# Patient Record
Sex: FEMALE | Race: WHITE | NOT HISPANIC OR LATINO | Employment: FULL TIME | URBAN - METROPOLITAN AREA
[De-identification: names, ages, dates, MRNs, and addresses within clinical notes are randomized per-mention and may not be internally consistent; named-entity substitution may affect disease eponyms.]

---

## 2018-07-11 ENCOUNTER — HOSPITAL ENCOUNTER (OUTPATIENT)
Dept: MEDSURG UNIT | Facility: HOSPITAL | Age: 23
End: 2018-07-13
Attending: COLON & RECTAL SURGERY | Admitting: COLON & RECTAL SURGERY

## 2018-07-11 LAB
ABS NEUT (OLG): 16.33 X10(3)/MCL (ref 2.1–9.2)
ALBUMIN SERPL-MCNC: 4.4 GM/DL (ref 3.4–5)
ALBUMIN/GLOB SERPL: 1 {RATIO}
ALP SERPL-CCNC: 55 UNIT/L (ref 45–117)
ALT SERPL-CCNC: 20 UNIT/L (ref 13–56)
AMYLASE SERPL-CCNC: 86 UNIT/L (ref 25–115)
APPEARANCE, UA: ABNORMAL
AST SERPL-CCNC: 13 UNIT/L (ref 15–37)
B-HCG SERPL QL: NEGATIVE
BACTERIA SPEC CULT: NORMAL
BASOPHILS # BLD AUTO: 0.02 X10(3)/MCL (ref 0–0.2)
BASOPHILS NFR BLD AUTO: 0.1 % (ref 0–1)
BILIRUB SERPL-MCNC: 1.1 MG/DL (ref 0.2–1)
BILIRUB UR QL STRIP: NEGATIVE
BILIRUBIN DIRECT+TOT PNL SERPL-MCNC: 0.3 MG/DL (ref 0–0.2)
BILIRUBIN DIRECT+TOT PNL SERPL-MCNC: 0.8 MG/DL (ref 0–1)
BUN SERPL-MCNC: 12 MG/DL (ref 7–18)
CALCIUM SERPL-MCNC: 8.9 MG/DL (ref 8.5–10.1)
CHLORIDE SERPL-SCNC: 105 MMOL/L (ref 98–107)
CO2 SERPL-SCNC: 24 MMOL/L (ref 21–32)
COLOR UR: YELLOW
CREAT SERPL-MCNC: 0.64 MG/DL (ref 0.55–1.02)
EOSINOPHIL # BLD AUTO: 0.04 X10(3)/MCL (ref 0–0.9)
EOSINOPHIL NFR BLD AUTO: 0.2 % (ref 0–6.4)
ERYTHROCYTE [DISTWIDTH] IN BLOOD BY AUTOMATED COUNT: 12.6 % (ref 11.5–17)
GLOBULIN SER-MCNC: 4 GM/DL (ref 2–4)
GLUCOSE (UA): NEGATIVE
GLUCOSE SERPL-MCNC: 116 MG/DL (ref 74–106)
HCT VFR BLD AUTO: 43.5 % (ref 37–47)
HGB BLD-MCNC: 15 GM/DL (ref 12–16)
HGB UR QL STRIP: NEGATIVE
IMM GRANULOCYTES # BLD AUTO: 0.06 10*3/UL (ref 0–0.02)
IMM GRANULOCYTES NFR BLD AUTO: 0.3 % (ref 0–0.43)
KETONES UR QL STRIP: ABNORMAL
LEUKOCYTE ESTERASE UR QL STRIP: ABNORMAL
LIPASE SERPL-CCNC: 139 UNIT/L (ref 73–393)
LYMPHOCYTES # BLD AUTO: 1.6 X10(3)/MCL (ref 0.6–4.6)
LYMPHOCYTES NFR BLD AUTO: 8.4 % (ref 16–44)
MCH RBC QN AUTO: 30.7 PG (ref 27–31)
MCHC RBC AUTO-ENTMCNC: 34.5 GM/DL (ref 33–36)
MCV RBC AUTO: 89.1 FL (ref 80–94)
MONOCYTES # BLD AUTO: 0.95 X10(3)/MCL (ref 0.1–1.3)
MONOCYTES NFR BLD AUTO: 5 % (ref 4–12.1)
NEUTROPHILS # BLD AUTO: 16.33 X10(3)/MCL (ref 2.1–9.2)
NEUTROPHILS NFR BLD AUTO: 86 % (ref 43–73)
NITRITE UR QL STRIP: NEGATIVE
NRBC BLD AUTO-RTO: 0 % (ref 0–0.2)
PH UR STRIP: 7.5 [PH] (ref 5–7)
PLATELET # BLD AUTO: 242 X10(3)/MCL (ref 130–400)
PMV BLD AUTO: 10.5 FL (ref 7.4–10.4)
POTASSIUM SERPL-SCNC: 3.6 MMOL/L (ref 3.5–5.1)
PROT SERPL-MCNC: 8.4 GM/DL (ref 6.4–8.2)
PROT UR QL STRIP: NEGATIVE
RBC # BLD AUTO: 4.88 X10(6)/MCL (ref 4.2–5.4)
RBC #/AREA URNS HPF: 0 /[HPF]
SODIUM SERPL-SCNC: 138 MMOL/L (ref 136–145)
SP GR UR STRIP: 1.02 (ref 1–1.03)
SQUAMOUS EPITHELIAL, UA: NORMAL /LPF
UROBILINOGEN UR STRIP-ACNC: NEGATIVE
WBC # SPEC AUTO: 19 X10(3)/MCL (ref 4.5–11.5)
WBC #/AREA URNS HPF: NORMAL /HPF

## 2018-07-13 LAB
ABS NEUT (OLG): 6.59 X10(3)/MCL (ref 2.1–9.2)
ALBUMIN SERPL-MCNC: 2.9 GM/DL (ref 3.4–5)
ALBUMIN/GLOB SERPL: 0.9 {RATIO}
ALP SERPL-CCNC: 43 UNIT/L (ref 38–126)
ALT SERPL-CCNC: 14 UNIT/L (ref 12–78)
AST SERPL-CCNC: 7 UNIT/L (ref 15–37)
BASOPHILS # BLD AUTO: 0 X10(3)/MCL (ref 0–0.2)
BASOPHILS NFR BLD AUTO: 0 %
BILIRUB SERPL-MCNC: 0.5 MG/DL (ref 0.2–1)
BILIRUBIN DIRECT+TOT PNL SERPL-MCNC: 0.2 MG/DL (ref 0–0.2)
BILIRUBIN DIRECT+TOT PNL SERPL-MCNC: 0.3 MG/DL (ref 0–0.8)
BUN SERPL-MCNC: 4 MG/DL (ref 7–18)
CALCIUM SERPL-MCNC: 8.6 MG/DL (ref 8.5–10.1)
CHLORIDE SERPL-SCNC: 107 MMOL/L (ref 98–107)
CO2 SERPL-SCNC: 26 MMOL/L (ref 21–32)
CREAT SERPL-MCNC: 0.5 MG/DL (ref 0.55–1.02)
EOSINOPHIL # BLD AUTO: 0 X10(3)/MCL (ref 0–0.9)
EOSINOPHIL NFR BLD AUTO: 0 %
ERYTHROCYTE [DISTWIDTH] IN BLOOD BY AUTOMATED COUNT: 12.7 % (ref 11.5–17)
GLOBULIN SER-MCNC: 3.1 GM/DL (ref 2.4–3.5)
GLUCOSE SERPL-MCNC: 100 MG/DL (ref 74–106)
HCT VFR BLD AUTO: 34.7 % (ref 37–47)
HGB BLD-MCNC: 11.5 GM/DL (ref 12–16)
LYMPHOCYTES # BLD AUTO: 2.5 X10(3)/MCL (ref 0.6–4.6)
LYMPHOCYTES NFR BLD AUTO: 25 %
MCH RBC QN AUTO: 30.7 PG (ref 27–31)
MCHC RBC AUTO-ENTMCNC: 33.1 GM/DL (ref 33–36)
MCV RBC AUTO: 92.5 FL (ref 80–94)
MONOCYTES # BLD AUTO: 0.7 X10(3)/MCL (ref 0.1–1.3)
MONOCYTES NFR BLD AUTO: 7 %
NEUTROPHILS # BLD AUTO: 6.59 X10(3)/MCL (ref 1.4–7.9)
NEUTROPHILS NFR BLD AUTO: 66 %
PLATELET # BLD AUTO: 206 X10(3)/MCL (ref 130–400)
PMV BLD AUTO: 11 FL (ref 9.4–12.4)
POTASSIUM SERPL-SCNC: 3.8 MMOL/L (ref 3.5–5.1)
PROT SERPL-MCNC: 6 GM/DL (ref 6.4–8.2)
RBC # BLD AUTO: 3.75 X10(6)/MCL (ref 4.2–5.4)
SODIUM SERPL-SCNC: 142 MMOL/L (ref 136–145)
WBC # SPEC AUTO: 9.9 X10(3)/MCL (ref 4.5–11.5)

## 2018-07-14 LAB — FINAL CULTURE: NORMAL

## 2022-04-30 NOTE — DISCHARGE SUMMARY
Patient:   Rachel Pool            MRN: [id number]            FIN: [id number]               Age:   22 years     Sex:  Female     :  1995   Associated Diagnoses:   Abdominal pain; Appendicitis   Author:   Felipe Laird      Results Review   General results   New results : ResultsNew (never reviewed)   2018 4:20 CDT       Sodium Lvl                142 mmol/L                             Potassium Lvl             3.8 mmol/L                             Chloride                  107 mmol/L                             CO2                       26.0 mmol/L                             Calcium Lvl               8.6 mg/dL                             Glucose Lvl               100 mg/dL                             BUN                       4.0 mg/dL  LOW                             Creatinine                0.50 mg/dL  LOW                             eGFR-AA                   >60 mL/min/1.73 m2  NA                             eGFR-LEYLA                  >60 mL/min/1.73 m2  NA                             Bili Total                0.5 mg/dL                             Bili Direct               0.20 mg/dL                             Bili Indirect             0.30 mg/dL                             AST                       7 unit/L  LOW                             ALT                       14 unit/L                             Alk Phos                  43 unit/L                             Total Protein             6.0 gm/dL  LOW                             Albumin Lvl               2.90 gm/dL  LOW                             Globulin                  3.10 gm/dL                             A/G Ratio                 0.9  NA                             WBC                       9.9 x10(3)/mcL                             RBC                       3.75 x10(6)/mcL  LOW                             Hgb                       11.5 gm/dL  LOW                             Hct                       34.7 %  LOW                              Platelet                  206 x10(3)/mcL                             MCV                       92.5 fL                             MCH                       30.7 pg                             MCHC                      33.1 gm/dL                             RDW                       12.7 %                             MPV                       11.0 fL                             Abs Neut                  6.59 x10(3)/mcL                             Neutro Auto               66 %  NA                             Lymph Auto                25 %  NA                             Mono Auto                 7 %  NA                             Eos Auto                  0 %  NA                             Abs Eos                   0.0 x10(3)/mcL                             Basophil Auto             0 %  NA                             Abs Neutro                6.59 x10(3)/mcL                             Abs Lymph                 2.5 x10(3)/mcL                             Abs Mono                  0.7 x10(3)/mcL                             Abs Baso                  0.0 x10(3)/mcL        Discharge Information   Discharge Summary Information     Admitted  7/11/2018.     Discharged  7/13/2018.     Abdominal pain (PNED [id number]).     Appendicitis ([id number] K37).        Physical Examination   Vital signs   Within normal limits.     General   Within normal limits.     Alert and oriented X3.     No acute distress.     Cardiovascular   Within normal limits.     Respiratory   Within normal limits.     Gastrointestinal   Soft and nontender.  Laparoscopic sites clean dry and intact.  Mild soreness around lap sites.  Complaining of of some nausea..     Upper extremity musculoskeletal   Within normal limits.     Back/ Torso musculoskeletal   Within normal limits.     Lower extremity musculoskeletal   Within normal limits.     Integumentary   Within normal limits.     Neurologic   Within  normal limits.     Alert and oriented.        Hospital Course   22-year-old  female presented to the emergency department with acute onset of right lower quadrant abdominal pain.  She had a workup that was equivocal for acute appendicitis.  She did not have any vaginal discharge and was in a monogamous relationship but used protection.  She was taken on hospital day 1 for laparoscopic appendectomy that was uneventful.  She did have some expected postoperative nausea as well as tenderness around the laparoscopic sites.  We will advance her diet this morning and continue pain control and nausea control.  If she was able to tolerate a diet this morning to be discharged home.  She should not lift anything more than 10 pounds for 4 weeks.  She will follow-up in our clinic as scheduled.  In the unlikely event that her symptoms persist she should see her primary care doctor for a GYN workup.  Although this is unlikely given her presentation and history.      Discharge Plan   As above.

## 2022-04-30 NOTE — ED PROVIDER NOTES
Patient:   Rachel Pool            MRN: [id number]            FIN: [id number]               Age:   22 years     Sex:  Female     :  1995   Associated Diagnoses:   Appendicitis   Author:   Speedy Calix MD      Basic Information   Time seen: Immediately upon arrival.   Arrival mode: Private vehicle.   History limitation: None.   Additional information: Chief Complaint from Nursing Triage Note : Chief Complaint   2018 22:16 CDT      Chief Complaint           started today as epigastric pain and nausea; radiating now to lower abd-  .      History of Present Illness   The patient presents with abdominal pain.  The onset was 7  hours ago.  The course/duration of symptoms is constant.  The character of symptoms is crampy.  The Location of pain at onset was upper and abdominal.  The degree at present is moderate.  The Location of pain at present is lower and abdominal.  Radiating pain: none. The exacerbating factor is none.  The relieving factor is none.  Risk factors consist of none.  Associated symptoms: nausea, denies vomiting, denies diarrhea and denies fever.  Additional history: sexual history: denies unprotected intercourse and denies multiple partners.  Denies vaginal bleeding vaginal discharge..        Review of Systems   Constitutional symptoms:  Negative except as documented in HPI.   Skin symptoms:  Negative except as documented in HPI.   Eye symptoms:  Negative except as documented in HPI.   ENMT symptoms:  Negative except as documented in HPI.   Respiratory symptoms:  Negative except as documented in HPI.   Cardiovascular symptoms:  Negative except as documented in HPI.   Gastrointestinal symptoms:  Abdominal pain, nausea.    Genitourinary symptoms:  Negative except as documented in HPI.   Musculoskeletal symptoms:  Negative except as documented in HPI.   Neurologic symptoms:  Negative except as documented in HPI.   Psychiatric symptoms:  Negative except as documented in HPI.    Endocrine symptoms:  Negative except as documented in HPI.   Hematologic/Lymphatic symptoms:  Negative except as documented in HPI.   Allergy/immunologic symptoms:  Negative except as documented in HPI.             Additional review of systems information: All other systems reviewed and otherwise negative.      Health Status   Allergies:    Allergic Reactions (Selected)  Severity Not Documented  Augmentin- No reactions were documented.  Ciprofloxacin- No reactions were documented.  Cyproheptadine Hydrochloride- No reactions were documented.  Latex- No reactions were documented.  Penicillins- No reactions were documented.,    Allergies (5) Active Reaction  Augmentin None Documented  ciprofloxacin None Documented  Cyproheptadine Hydrochloride None Documented  Latex None Documented  penicillins None Documented  .   Medications:  (Selected)   Inpatient Medications  Ordered  IVF Normal Saline NS Bolus 1000ml 1,000 mL: 1,000 mL, 1,000 mL, IV, 999 mL/hr, start date 07/11/18 22:29:00 CDT  Documented Medications  Documented  Lo Loestrin Fe 1 mg-10 mcg (24)/10 mcg (2) tablet: 1 tab(s), Oral, Daily.   Menstrual history: Last menstrual period: 3 week(s) ago.      Past Medical/ Family/ Social History   Medical history:    No active or resolved past medical history items have been selected or recorded..   Surgical history:    [place] tooth ([id number]).  Tonsils and adenoids ([id number])..   Family history:    No family history items have been selected or recorded..   Social history:    Social & Psychosocial Habits    Alcohol  12/20/2015 Risk Assessment: Denies Alcohol Use    07/11/2018  Use: Current    Type: Wine    Frequency: 1-2 times per week    Substance Abuse  12/20/2015 Risk Assessment: Denies Substance Abuse    Tobacco  12/20/2015 Risk Assessment: Denies Tobacco Use    07/11/2018  Use: Never smoker  , Alcohol use: Denies, Tobacco use: Denies, Drug use: Denies.   Problem list:    No qualifying data available  .      Physical  Examination               Vital Signs   Vital Signs   7/11/2018 22:16 CDT      Temperature Oral          37.0 DegC                             Temperature Oral (calculated)             98.60 DegF                             Peripheral Pulse Rate     106 bpm  HI                             Respiratory Rate          16 br/min                             SpO2                      99 %                             Systolic Blood Pressure   138 mmHg                             Diastolic Blood Pressure  92 mmHg  HI  .      Vital Signs (last 24 hrs)_____  Last Charted___________  Temp Oral     37.0 DegC  (JUL 11 22:16)  Heart Rate Peripheral   H 106bpm  (JUL 11 22:16)  Resp Rate         16 br/min  (JUL 11 22:16)  SBP      138 mmHg  (JUL 11 22:16)  DBP      H 92mmHg  (JUL 11 22:16)  SpO2      99 %  (JUL 11 22:16)  .   General:  Alert, no acute distress.    Skin:  Warm, dry, intact, no rash.    Head:  Atraumatic.   Neck:  Supple.   Eye:  Normal conjunctiva.   Ears, nose, mouth and throat:  Oral mucosa moist.   Cardiovascular:  Regular rate and rhythm, No murmur.    Respiratory:  Lungs are clear to auscultation, respirations are non-labored.    Gastrointestinal:  Soft, Non distended, Normal bowel sounds, Tenderness: Moderate, suprapubic, Guarding: Negative, Rebound: Negative.    Musculoskeletal:  Normal ROM, normal strength.    Neurological:  Alert and oriented to person, place, time, and situation, No focal neurological deficit observed.       Medical Decision Making   Documents reviewed:  Emergency department nurses' notes.   Orders  Launch Orders   Pharmacy:  Bentyl 10 mg/mL injectable solution (Order): 20 mg, form: Injection, IM, Now, first dose 7/11/2018 22:48 CDT, stop date 7/11/2018 22:48 CDT, STAT, Launch Orders   Pharmacy:  morphine 2 mg/mL injectable solution (Order): 6 mg, IV, Once, first dose 7/12/2018 1:15 CDT, stop date 7/12/2018 1:15 CDT, STAT.    Pregnancy test:  UCG-negative.   Results review:  Lab results :  Lab View   7/11/2018 22:36 CDT      WBC                       19.0 x10(3)/mcL  HI  ,    No qualifying data available.    Radiology results:  Computed tomography, reviewed radiologist's report.       Reexamination/ Reevaluation   Time: 7/12/2018 00:30:00 .   Notes: pt's tenderness is more RLQ/suprapubic than LLQ.      Impression and Plan   Diagnosis   Appendicitis ([id number] K37)      Calls-Consults   -  Michelle FNP-C, [facility].    -  7/12/2018 01:13:00 , Rashel EVANS, oFrtino HADLEY.    Plan   Condition: Stable.    Disposition: Medically cleared, Admit time  7/12/2018 01:13:00.

## 2022-04-30 NOTE — H&P
Patient:   Rachel Pool            MRN: [id number]            FIN: [id number]               Age:   22 years     Sex:  Female     :  1995   Associated Diagnoses:   None   Author:   Felipe Laird      Basic Information   Admit information:  Appendicitis .    Source of history:  Self, Family member, Medical record.    Present at bedside:  Family member, Medical personnel.    Referral source:  Emergency department.    History limitation:  None.       Chief Complaint   2018 22:16 CDT      started today as epigastric pain and nausea; radiating now to lower abd-        History of Present Illness   Very pleasant 22-year-old female who presents with a relatively sudden onset of right lower quadrant abdominal pain and nausea started approximately 3 PM yesterday while at work.  She has not had any vomiting.  Bowel movements have been normal.  No fevers.  She is now actively vomiting but states that this only started within the last hour.  She presented to an outside hospital where she had a workup that was equivocal for appendicitis but did have a leukocytosis and exam consistent with appendicitis.  She was sent here for surgical evaluation.  She has no past medical or surgical history.  She works as a .      Review of Systems   REVIEW OF SYSTEMS:   CONSTITUTIONAL: There is no fever, weight loss or cough.   CENTRAL NERVOUS SYSTEM: No  vision changes, seizure or weakness.   ENT: No  congestion, postnasal drip, sore throat, nose bleeds, or hearing changes.   RESPIRATORY: No history of shortness of breath, wheezing or chest pain.   CARDIOVASCULAR: No history of chest palpitations or arrhythmias.   GASTROINTESTINAL: As above   GENITOURINARY: No history of dysuria, frequency or vaginal discharge.   MUSCULOSKELETAL: No weakness, pain, parethesia, temperature differences.      Health Status   Allergies:    Allergic Reactions (Selected)  Severity Not Documented  Augmentin- No reactions were  documented.  Cyproheptadine Hydrochloride- No reactions were documented.  Latex- No reactions were documented.  Penicillins- No reactions were documented.      Histories   Past Medical History: Negative   Procedure history: Negative   Social History        Social & Psychosocial Habits    Alcohol  12/20/2015 Risk Assessment: Denies Alcohol Use    07/11/2018  Use: Current    Type: Wine    Frequency: 1-2 times per week    Substance Abuse  12/20/2015 Risk Assessment: Denies Substance Abuse    Tobacco  12/20/2015 Risk Assessment: Denies Tobacco Use    07/11/2018  Use: Never smoker  .        Physical Examination   Vital Signs   7/12/2018 3:09 CDT       Peripheral Pulse Rate     98 bpm                             Respiratory Rate          20 br/min                             SpO2                      98 %                             Oxygen Therapy            Room air                             Systolic Blood Pressure   114 mmHg                             Diastolic Blood Pressure  88 mmHg                             Mean Arterial Pressure, Cuff              97 mmHg    7/12/2018 2:34 CDT       Peripheral Pulse Rate     97 bpm                             Respiratory Rate          19 br/min                             SpO2                      96 %                             Oxygen Therapy            Room air                             Systolic Blood Pressure   100 mmHg                             Diastolic Blood Pressure  68 mmHg                             Mean Arterial Pressure, Cuff              79 mmHg    7/12/2018 2:17 CDT       Peripheral Pulse Rate     109 bpm  HI                             Respiratory Rate          20 br/min                             SpO2                      97 %                             Oxygen Therapy            Room air    7/12/2018 2:10 CDT       Peripheral Pulse Rate     114 bpm  HI                             Respiratory Rate          20 br/min                             SpO2                       97 %                             Oxygen Therapy            Room air                             Systolic Blood Pressure   114 mmHg                             Diastolic Blood Pressure  79 mmHg                             Mean Arterial Pressure, Cuff              91 mmHg    7/12/2018 1:15 CDT       Temperature Oral          36.9 DegC                             Temperature Oral (calculated)             98.42 DegF                             Peripheral Pulse Rate     92 bpm                             SpO2                      99 %                             Systolic Blood Pressure   110 mmHg                             Diastolic Blood Pressure  71 mmHg                             Mean Arterial Pressure, Cuff              84 mmHg    7/12/2018 0:32 CDT       Peripheral Pulse Rate     88 bpm                             Respiratory Rate          16 br/min                             SpO2                      99 %                             Systolic Blood Pressure   118 mmHg                             Diastolic Blood Pressure  85 mmHg    7/11/2018 23:30 CDT      Peripheral Pulse Rate     98 bpm                             Respiratory Rate          16 br/min                             Systolic Blood Pressure   114 mmHg                             Diastolic Blood Pressure  62 mmHg    7/11/2018 22:16 CDT      Temperature Oral          37.0 DegC                             Temperature Oral (calculated)             98.60 DegF                             Peripheral Pulse Rate     106 bpm  HI                             Respiratory Rate          16 br/min                             SpO2                      99 %                             Systolic Blood Pressure   138 mmHg                             Diastolic Blood Pressure  92 mmHg  HI     Measurements from flowsheet : Measurements   7/11/2018 22:16 CDT      Weight Dosing             50 kg                             Weight Measured and Calculated in Lbs      110.23 lb                             Weight Estimated          50 kg                             Height/Length Dosing      152 cm                             Height/Length Estimated   152 cm                             Body Mass Index Estimated 21.64 kg/m2       GENERAL APPEARANCE: Well developed, well nourished, alert and cooperative, and appears to be in no acute distress.  HEAD: normocephalic.  EYES: PERRL, EOMI. Vision is grossly intact.  EARS: Hearing grossly intact.  NOSE: No nasal discharge.  THROAT: Oral cavity and pharynx normal. No inflammation, swelling, exudate, or lesions.   NECK: Neck supple, non-tender without lymphadenopathy..  CARDIAC: Normal S1 and S2. Rhythm is regular. There is no peripheral edema. Extremities are warm and well perfused. Capillary refill is less than 2 seconds.   LUNGS: Clear to auscultation without rales, rhonchi, wheezing or diminished breath sounds.  ABDOMEN: Soft.  Nondistended.  Bowel sounds active.  Tender in the right lower quadrant.  MUSKULOSKELETAL: ROM intact for extremities. No joint erythema or tenderness. Normal muscular development. Normal gait.  EXTREMITIES: No significant deformity or joint abnormality. No edema. Peripheral pulses intact. No varicosities.  LOWER EXTREMITY: examination of both ankles, feet, knees, legs, and hips reveals normal range of motion, normal sensation without tenderness, swelling, discoloration, crepitus, weakness or deformity.  NEUROLOGICAL: Strength and sensation symmetric and intact throughout. Reflexes 2+ throughout.  SKIN: Skin normal color, texture and turgor with no lesions or eruptions.  PSYCHIATRIC: The mental examination revealed the patient was oriented to person, place, and time. The patient was able to demonstrate good judgement and reason, without hallucinations, abnormal affect or abnormal behaviors during the examination. Patient is not suicidal.      Review / Management   Results review:     Labs (Last four charted  values)  WBC                  H 19.0 (JUL 11)   Hgb                  15.0 (JUL 11)   Hct                  43.5 (JUL 11)   Plt                  242 (JUL 11)   Na                   138 (JUL 11)   K                    3.6 (JUL 11)   CO2                  24.0 (JUL 11)   Cl                   105 (JUL 11)   Cr                   0.64 (JUL 11)   BUN                  12.0 (JUL 11)   Glucose Random       H 116 (JUL 11) .    Labs Last 24 Hours   Chemistry  Hematology/Coagulation    Sodium Lvl: 138 mmol/L (07/11/18 22:36:00 CDT) WBC: 19 x10(3)/mcL High (07/11/18 22:36:00 CDT)   Potassium Lvl: 3.6 mmol/L (07/11/18 22:36:00 CDT) RBC: 4.88 x10(6)/mcL (07/11/18 22:36:00 CDT)   Chloride: 105 mmol/L (07/11/18 22:36:00 CDT) Hgb: 15 gm/dL (07/11/18 22:36:00 CDT)   CO2: 24 mmol/L (07/11/18 22:36:00 CDT) Hct: 43.5 % (07/11/18 22:36:00 CDT)   Calcium Lvl: 8.9 mg/dL (07/11/18 22:36:00 CDT) Platelet: 242 x10(3)/mcL (07/11/18 22:36:00 CDT)   Glucose Lvl: 116 mg/dL High (07/11/18 22:36:00 CDT) MCV: 89.1 fL (07/11/18 22:36:00 CDT)   BUN: 12 mg/dL (07/11/18 22:36:00 CDT) MCH: 30.7 pg (07/11/18 22:36:00 CDT)   Creatinine: 0.64 mg/dL (07/11/18 22:36:00 CDT) MCHC: 34.5 gm/dL (07/11/18 22:36:00 CDT)   eGFR-AA: >60 (07/11/18 22:36:00 CDT) RDW: 12.6 % (07/11/18 22:36:00 CDT)   eGFR-LEYLA: >60 (07/11/18 22:36:00 CDT) MPV: 10.5 fL High (07/11/18 22:36:00 CDT)   Amylase Lvl: 86 unit/L (07/11/18 22:36:00 CDT) Abs Neut: 16.33 x10(3)/mcL High (07/11/18 22:36:00 CDT)   Bili Total: 1.1 mg/dL High (07/11/18 22:36:00 CDT) Neutro Auto: 86 % High (07/11/18 22:36:00 CDT)   Bili Direct: 0.3 mg/dL High (07/11/18 22:36:00 CDT) Lymph Auto: 8.4 % Low (07/11/18 22:36:00 CDT)   Bili Indirect: 0.8 mg/dL (07/11/18 22:36:00 CDT) Mono Auto: 5 % (07/11/18 22:36:00 CDT)   AST: 13 unit/L Low (07/11/18 22:36:00 CDT) Eos Auto: 0.2 % (07/11/18 22:36:00 CDT)   ALT: 20 unit/L (07/11/18 22:36:00 CDT) Abs Eos: 0.04 x10(3)/mcL (07/11/18 22:36:00 CDT)   Alk Phos: 55 unit/L (07/11/18  22:36:00 CDT) Basophil Auto: 0.1 % (07/11/18 22:36:00 CDT)   Total Protein: 8.4 gm/dL High (07/11/18 22:36:00 CDT) Abs Neutro: 16.33 x10(3)/mcL High (07/11/18 22:36:00 CDT)   Albumin Lvl: 4.4 gm/dL (07/11/18 22:36:00 CDT) Abs Lymph: 1.6 x10(3)/mcL (07/11/18 22:36:00 CDT)   Globulin: 4 gm/dL (07/11/18 22:36:00 CDT) Abs Mono: 0.95 x10(3)/mcL (07/11/18 22:36:00 CDT)   A/G Ratio: 1 (07/11/18 22:36:00 CDT) Abs Baso: 0.02 x10(3)/mcL (07/11/18 22:36:00 CDT)   Lipase Lvl: 139 unit/L (07/11/18 22:36:00 CDT) NRBC%: 0.0 (07/11/18 22:36:00 CDT)   U Beta hCG Ql: Negative (07/11/18 22:28:00 CDT) IG%: 0.3 % (07/11/18 22:36:00 CDT)    IG#: 0.06 High (07/11/18 22:36:00 CDT)       CT abdomen:  The appendix is mildly fluid distended, measuring approximately 8 mm with mild mucosal wall enhancement but no wall thickening and equivocal periappendiceal fat edema.  Equivocal for acute appendicitis without evidence of free air or abscess.      Impression and Plan   Acute appendicitis  Leukocytosis  Nausea vomiting  Right lower quadrant abdominal pain    Admit to the floor  N.p.o.  Plan for operating room early a.m. today  IV fluids  Patient is placed on Levaquin at outside hospital due to her penicillin allergy and we will continue that here for now

## 2022-04-30 NOTE — OP NOTE
Patient:   Rachel Pool            MRN: [id number]            FIN: [id number]               Age:   22 years     Sex:  Female     :  1995   Associated Diagnoses:   None   Author:   Rola Zhong MD      Brief Operative Note   Operative Information   Date/ Time:  2018 08:46:00.     Preoperative Diagnosis: Acute Appendicitis.     Postoperative Diagnosis: same.     Procedures Performed: Laparoscopic Appendectomy.     Surgeon: Rola Zhong MD.     Assistant: Giorgio Ramsay MD Removed: appendix.     Esimated blood loss: loss  50  cc.     Description of Procedure/Findings/    Complications: After informed consent was obtained, the patient was taken to the operative suite and general endotracheal anesthesia was induced. A timeout was performed confirming patient and procedure and the abdomen was prepped and draped in the normal sterile fashion. A 5mm incision was made above the patients umbilicus and a Veres needle was used to establish abdominal insufflation. Using an 5mm optiview trocar the camera was introduced, and under direct visualization a 12mm port was placed in the left lower quadrant as well as a 5mm port suprapubically. We then placed the patient in trendelenburg and airplaned toward the left. The appendix was easily visualized, grasped and a window was bluntly dissected in the mesentery adjacent to the appendix. A blue staple load on an endoGIA stapler was used to transect the appendiceal base and a white staple load was used to transect the appendiceal mesentery. The appendix was placed in an endocatch bag and removed through the 12mm port. The appendiceal base was inspected and found to be hemostatic, however we did place 3 clips on the mesenteric staple line as this was oozing mildly- this ensured hemostasis.  Murky fluid was suctioned from the pelvis. The ports were removed under direct visualization and the 12mm port fascia was closed with 0 vicryl  suture. The skin was closed with 4-0 vicryl suture and sterile dressings were applied. All sponge and needle counts were correct at the completion of the case, the patient was awakened from anesthesia, extubated, and taken to the PACU in stable condition. .

## 2022-11-17 ENCOUNTER — HOSPITAL ENCOUNTER (EMERGENCY)
Facility: HOSPITAL | Age: 27
Discharge: HOME | End: 2022-11-17
Attending: STUDENT IN AN ORGANIZED HEALTH CARE EDUCATION/TRAINING PROGRAM
Payer: COMMERCIAL

## 2022-11-17 VITALS
HEIGHT: 60 IN | DIASTOLIC BLOOD PRESSURE: 86 MMHG | OXYGEN SATURATION: 99 % | RESPIRATION RATE: 18 BRPM | SYSTOLIC BLOOD PRESSURE: 117 MMHG | BODY MASS INDEX: 24.74 KG/M2 | WEIGHT: 126 LBS | HEART RATE: 96 BPM | TEMPERATURE: 98 F

## 2022-11-17 DIAGNOSIS — A08.4 VIRAL GASTROENTERITIS: Primary | ICD-10-CM

## 2022-11-17 DIAGNOSIS — N39.0 ACUTE UTI: ICD-10-CM

## 2022-11-17 LAB
ALBUMIN SERPL-MCNC: 4.3 GM/DL (ref 3.5–5)
ALBUMIN/GLOB SERPL: 1.4 RATIO (ref 1.1–2)
ALP SERPL-CCNC: 66 UNIT/L (ref 40–150)
ALT SERPL-CCNC: 27 UNIT/L (ref 0–55)
APPEARANCE UR: ABNORMAL
AST SERPL-CCNC: 24 UNIT/L (ref 5–34)
B-HCG SERPL QL: NEGATIVE
BACTERIA #/AREA URNS AUTO: ABNORMAL /HPF
BASOPHILS # BLD AUTO: 0.01 X10(3)/MCL (ref 0–0.2)
BASOPHILS NFR BLD AUTO: 0.1 %
BILIRUB UR QL STRIP.AUTO: NEGATIVE MG/DL
BILIRUBIN DIRECT+TOT PNL SERPL-MCNC: 0.5 MG/DL
BUN SERPL-MCNC: 10.3 MG/DL (ref 7–18.7)
CALCIUM SERPL-MCNC: 9.5 MG/DL (ref 8.4–10.2)
CHLORIDE SERPL-SCNC: 103 MMOL/L (ref 98–107)
CO2 SERPL-SCNC: 24 MMOL/L (ref 22–29)
COLOR UR AUTO: ABNORMAL
CREAT SERPL-MCNC: 0.7 MG/DL (ref 0.55–1.02)
EOSINOPHIL # BLD AUTO: 0.02 X10(3)/MCL (ref 0–0.9)
EOSINOPHIL NFR BLD AUTO: 0.3 %
ERYTHROCYTE [DISTWIDTH] IN BLOOD BY AUTOMATED COUNT: 12.6 % (ref 11.5–17)
GFR SERPLBLD CREATININE-BSD FMLA CKD-EPI: >60 MLS/MIN/1.73/M2
GLOBULIN SER-MCNC: 3.1 GM/DL (ref 2.4–3.5)
GLUCOSE SERPL-MCNC: 95 MG/DL (ref 74–100)
GLUCOSE UR QL STRIP.AUTO: NEGATIVE MG/DL
HCT VFR BLD AUTO: 45.4 % (ref 37–47)
HGB BLD-MCNC: 15.5 GM/DL (ref 12–16)
IMM GRANULOCYTES # BLD AUTO: 0.02 X10(3)/MCL (ref 0–0.04)
IMM GRANULOCYTES NFR BLD AUTO: 0.3 %
KETONES UR QL STRIP.AUTO: ABNORMAL MG/DL
LEUKOCYTE ESTERASE UR QL STRIP.AUTO: ABNORMAL UNIT/L
LIPASE SERPL-CCNC: 29 U/L
LYMPHOCYTES # BLD AUTO: 0.88 X10(3)/MCL (ref 0.6–4.6)
LYMPHOCYTES NFR BLD AUTO: 13 %
MCH RBC QN AUTO: 30.3 PG (ref 27–31)
MCHC RBC AUTO-ENTMCNC: 34.1 MG/DL (ref 33–36)
MCV RBC AUTO: 88.8 FL (ref 80–94)
MONOCYTES # BLD AUTO: 0.53 X10(3)/MCL (ref 0.1–1.3)
MONOCYTES NFR BLD AUTO: 7.8 %
NEUTROPHILS # BLD AUTO: 5.3 X10(3)/MCL (ref 2.1–9.2)
NEUTROPHILS NFR BLD AUTO: 78.5 %
NITRITE UR QL STRIP.AUTO: NEGATIVE
NRBC BLD AUTO-RTO: 0 %
PH UR STRIP.AUTO: 6 [PH]
PLATELET # BLD AUTO: 253 X10(3)/MCL (ref 130–400)
PMV BLD AUTO: 10.2 FL (ref 7.4–10.4)
POTASSIUM SERPL-SCNC: 3.6 MMOL/L (ref 3.5–5.1)
PROT SERPL-MCNC: 7.4 GM/DL (ref 6.4–8.3)
PROT UR QL STRIP.AUTO: ABNORMAL MG/DL
RBC # BLD AUTO: 5.11 X10(6)/MCL (ref 4.2–5.4)
RBC #/AREA URNS AUTO: 12 /HPF
RBC UR QL AUTO: ABNORMAL UNIT/L
SODIUM SERPL-SCNC: 139 MMOL/L (ref 136–145)
SP GR UR STRIP.AUTO: 1.03 (ref 1–1.03)
SQUAMOUS #/AREA URNS AUTO: >36 /HPF
UROBILINOGEN UR STRIP-ACNC: 1 MG/DL
WBC # SPEC AUTO: 6.8 X10(3)/MCL (ref 4.5–11.5)
WBC #/AREA URNS AUTO: 245 /HPF

## 2022-11-17 PROCEDURE — 87088 URINE BACTERIA CULTURE: CPT | Performed by: NURSE PRACTITIONER

## 2022-11-17 PROCEDURE — 81001 URINALYSIS AUTO W/SCOPE: CPT | Performed by: NURSE PRACTITIONER

## 2022-11-17 PROCEDURE — 99284 EMERGENCY DEPT VISIT MOD MDM: CPT | Mod: 25

## 2022-11-17 PROCEDURE — 83690 ASSAY OF LIPASE: CPT | Performed by: NURSE PRACTITIONER

## 2022-11-17 PROCEDURE — 96361 HYDRATE IV INFUSION ADD-ON: CPT

## 2022-11-17 PROCEDURE — 63600175 PHARM REV CODE 636 W HCPCS: Performed by: NURSE PRACTITIONER

## 2022-11-17 PROCEDURE — 96374 THER/PROPH/DIAG INJ IV PUSH: CPT

## 2022-11-17 PROCEDURE — 85025 COMPLETE CBC W/AUTO DIFF WBC: CPT | Performed by: NURSE PRACTITIONER

## 2022-11-17 PROCEDURE — 81025 URINE PREGNANCY TEST: CPT | Performed by: NURSE PRACTITIONER

## 2022-11-17 PROCEDURE — 63600175 PHARM REV CODE 636 W HCPCS: Performed by: PHYSICIAN ASSISTANT

## 2022-11-17 PROCEDURE — 25000003 PHARM REV CODE 250: Performed by: NURSE PRACTITIONER

## 2022-11-17 PROCEDURE — 80053 COMPREHEN METABOLIC PANEL: CPT | Performed by: NURSE PRACTITIONER

## 2022-11-17 RX ORDER — SODIUM CHLORIDE 9 MG/ML
1000 INJECTION, SOLUTION INTRAVENOUS
Status: COMPLETED | OUTPATIENT
Start: 2022-11-17 | End: 2022-11-17

## 2022-11-17 RX ORDER — ONDANSETRON 4 MG/1
4 TABLET, FILM COATED ORAL EVERY 6 HOURS
Qty: 12 TABLET | Refills: 0 | Status: SHIPPED | OUTPATIENT
Start: 2022-11-17

## 2022-11-17 RX ORDER — DICYCLOMINE HYDROCHLORIDE 20 MG/1
20 TABLET ORAL 2 TIMES DAILY
Qty: 14 TABLET | Refills: 0 | Status: SHIPPED | OUTPATIENT
Start: 2022-11-17 | End: 2022-11-24

## 2022-11-17 RX ORDER — CEPHALEXIN 500 MG/1
500 CAPSULE ORAL EVERY 12 HOURS
Qty: 14 CAPSULE | Refills: 0 | Status: SHIPPED | OUTPATIENT
Start: 2022-11-17 | End: 2022-11-24

## 2022-11-17 RX ORDER — KETOROLAC TROMETHAMINE 30 MG/ML
30 INJECTION, SOLUTION INTRAMUSCULAR; INTRAVENOUS
Status: COMPLETED | OUTPATIENT
Start: 2022-11-17 | End: 2022-11-17

## 2022-11-17 RX ORDER — ONDANSETRON 2 MG/ML
4 INJECTION INTRAMUSCULAR; INTRAVENOUS ONCE
Status: COMPLETED | OUTPATIENT
Start: 2022-11-17 | End: 2022-11-17

## 2022-11-17 RX ADMIN — ONDANSETRON 4 MG: 2 INJECTION INTRAMUSCULAR; INTRAVENOUS at 11:11

## 2022-11-17 RX ADMIN — KETOROLAC TROMETHAMINE 30 MG: 30 INJECTION, SOLUTION INTRAMUSCULAR; INTRAVENOUS at 12:11

## 2022-11-17 RX ADMIN — SODIUM CHLORIDE 1000 ML: 9 INJECTION, SOLUTION INTRAVENOUS at 11:11

## 2022-11-17 NOTE — ED PROVIDER NOTES
Encounter Date: 11/17/2022       History     Chief Complaint   Patient presents with    Abdominal Pain     C/o generalized abdominal pain since Tuesday. Pt reports n/v/d. Unable to tolerate PO. Denies spoiled foods, fever, cough. Reports HA that began yesterday     27 y.o. female presents to the ED with generalized abdominal cramping onset Tuesday with associated nausea, vomiting, diarrhea.  Notes onset of headache and low-grade fever yesterday as well. States her symptoms started after eating [employer] however notes her friend also ate the same thing without having symptoms.  Notes she was also started on Lexapro 1 week ago however has not been able take it since Tuesday.      The history is provided by the patient. No  was used.   Abdominal Pain  The current episode started two days ago. The onset of the illness was abrupt. The problem has not changed since onset.The abdominal pain is generalized. The abdominal pain does not radiate. The abdominal pain is relieved by nothing. The other symptoms of the illness include fever, nausea, vomiting and diarrhea. The other symptoms of the illness do not include shortness of breath or dysuria.   The patient states that she believes she is currently not pregnant. The patient has had a change in bowel habit. Symptoms associated with the illness do not include chills or back pain.   Review of patient's allergies indicates:   Allergen Reactions    Augmentin [amoxicillin-pot clavulanate] Rash    Cyproheptadine Palpitations     Causes heart to beat very fast     No past medical history on file.  No past surgical history on file.  No family history on file.  Social History     Tobacco Use    Smoking status: Never    Smokeless tobacco: Never     Review of Systems   Constitutional:  Positive for fever. Negative for chills.   HENT:  Negative for sore throat.    Respiratory:  Negative for shortness of breath.    Cardiovascular:  Negative for chest pain.    Gastrointestinal:  Positive for abdominal pain, diarrhea, nausea and vomiting.   Genitourinary:  Negative for dysuria.   Musculoskeletal:  Negative for back pain.   Skin:  Negative for rash.   Neurological:  Positive for headaches. Negative for weakness.   Hematological:  Does not bruise/bleed easily.   All other systems reviewed and are negative.    Physical Exam     Initial Vitals [11/17/22 1025]   BP Pulse Resp Temp SpO2   (!) 128/93 101 18 98.4 °F (36.9 °C) 98 %      MAP       --         Physical Exam    Nursing note and vitals reviewed.  Constitutional: She appears well-developed and well-nourished.   HENT:   Head: Normocephalic and atraumatic.   Eyes: EOM are normal. Pupils are equal, round, and reactive to light.   Neck: Neck supple.   Cardiovascular:  Normal rate, regular rhythm and normal heart sounds.           Pulmonary/Chest: Breath sounds normal.   Abdominal: Abdomen is soft. Bowel sounds are normal. There is generalized abdominal tenderness. There is no rebound and no guarding.   Musculoskeletal:         General: Normal range of motion.      Cervical back: Neck supple.     Neurological: She is alert and oriented to person, place, and time. She has normal strength. GCS score is 15. GCS eye subscore is 4. GCS verbal subscore is 5. GCS motor subscore is 6.   Skin: Skin is warm and dry.   Psychiatric: She has a normal mood and affect.       ED Course   Procedures  Labs Reviewed   URINALYSIS, REFLEX TO URINE CULTURE - Abnormal; Notable for the following components:       Result Value    Appearance, UA Turbid (*)     Specific Gravity, UA 1.034 (*)     Protein, UA 1+ (*)     Ketones, UA 4+ (*)     Blood, UA Trace (*)     Leukocyte Esterase, UA 2+ (*)     All other components within normal limits   URINALYSIS, MICROSCOPIC - Abnormal; Notable for the following components:    RBC, UA 12 (*)     WBC,  (*)     Squamous Epithelial Cells, UA >36 (*)     Bacteria, UA 3+ (*)     All other components within  normal limits   HCG QUALITATIVE URINE - Normal   LIPASE - Normal   CULTURE, URINE   CBC W/ AUTO DIFFERENTIAL    Narrative:     The following orders were created for panel order CBC Auto Differential.  Procedure                               Abnormality         Status                     ---------                               -----------         ------                     CBC with Differential[[id number]]                            Final result                 Please view results for these tests on the individual orders.   COMPREHENSIVE METABOLIC PANEL   CBC WITH DIFFERENTIAL          Imaging Results    None          Medications   0.9%  NaCl infusion (0 mLs Intravenous Stopped 11/17/22 1236)   ondansetron injection 4 mg (4 mg Intravenous Given 11/17/22 1103)   ketorolac injection 30 mg (30 mg Intravenous Given 11/17/22 1235)     Medical Decision Making:   Differential Diagnosis:   gastritis, gastroenteritis, pancreatitis, cholecystitis  Clinical Tests:   Lab Tests: Ordered and Reviewed  ED Management:  On exam patient with generalized abdominal cramping with nausea, vomiting, .  States it started after eating fast food a few days ago.  Given fluids in the ED as well as Toradol and Zofran with relief of symptoms.  Will discharge home with medications to treat for viral gastroenteritis versus food toxicity as well as antibiotics for UTI found on labs.  Will discharge home with strict return precautions for any worsening symptoms.           ED Course as of 11/17/22 2125   Thu Nov 17, 2022   1240 States nausea is better after Zofran  [MA]      ED Course User Index  [MA] Zeus Haley PA-C                 Clinical Impression:   Final diagnoses:  [A08.4] Viral gastroenteritis (Primary)  [N39.0] Acute UTI      ED Disposition Condition    Discharge Stable          ED Prescriptions       Medication Sig Dispense Start Date End Date Auth. Provider    dicyclomine (BENTYL) 20 mg tablet Take 1 tablet (20 mg total) by mouth 2  (two) times daily. for 7 days 14 tablet 11/17/2022 11/24/2022 Zeus Haley PA-C    ondansetron (ZOFRAN) 4 MG tablet Take 1 tablet (4 mg total) by mouth every 6 (six) hours. 12 tablet 11/17/2022 -- Zeus Haley PA-C    cephALEXin (KEFLEX) 500 MG capsule Take 1 capsule (500 mg total) by mouth every 12 (twelve) hours. for 7 days 14 capsule 11/17/2022 11/24/2022 Zeus Haley PA-C          Follow-up Information       Follow up With Specialties Details Why Contact Info    [facility] - Emergency Dept Emergency Medicine In 1 week If symptoms worsen [address] [facility] [phone]    Primary care provider  In 2 days As needed              Zeus Haley PA-C  11/17/22 3172

## 2022-11-17 NOTE — Clinical Note
"Rachel"Chery Pool was seen and treated in our emergency department on 11/17/2022.  She may return to work on 11/18/2022.       If you have any questions or concerns, please don't hesitate to call.      Zeus Haley PA-C"

## 2022-11-17 NOTE — FIRST PROVIDER EVALUATION
Medical screening examination initiated.  I have conducted a focused provider triage encounter, findings are as follows:    Brief history of present illness:  Patient states abdominal pain, nausea, vomiting, and diarrhea x 2 days.     There were no vitals filed for this visit.    Pertinent physical exam:  Awake, alert, ambulatory      Brief workup plan:  labs    Preliminary workup initiated; this workup will be continued and followed by the physician or advanced practice provider that is assigned to the patient when roomed.

## 2022-11-19 LAB — BACTERIA UR CULT: NORMAL
